# Patient Record
Sex: MALE | Race: BLACK OR AFRICAN AMERICAN | NOT HISPANIC OR LATINO | Employment: UNEMPLOYED | URBAN - METROPOLITAN AREA
[De-identification: names, ages, dates, MRNs, and addresses within clinical notes are randomized per-mention and may not be internally consistent; named-entity substitution may affect disease eponyms.]

---

## 2020-03-24 PROBLEM — C61 PROSTATE CANCER: Status: ACTIVE | Noted: 2020-03-24

## 2020-03-24 PROBLEM — F10.10 ETOH ABUSE: Status: ACTIVE | Noted: 2020-03-24

## 2020-04-02 PROBLEM — K76.89 LIVER NODULE: Status: ACTIVE | Noted: 2020-04-02

## 2020-04-02 PROBLEM — N28.1 SIMPLE RENAL CYST: Status: ACTIVE | Noted: 2020-04-02

## 2020-05-19 ENCOUNTER — NURSE TRIAGE (OUTPATIENT)
Dept: ADMINISTRATIVE | Facility: CLINIC | Age: 60
End: 2020-05-19

## 2020-05-19 NOTE — TELEPHONE ENCOUNTER
Called patient on behalf of [facility]. Pt denied developing any fever, cough or shortness of breath since the procedure.    Reason for Disposition   Follow-up call to recent contact and information only call, no triage required    Protocols used: INFORMATION ONLY CALL - NO TRIAGE-P-[place]

## 2020-07-02 PROBLEM — K76.9: Status: ACTIVE | Noted: 2020-07-02

## 2020-07-02 PROBLEM — Z85.9: Status: ACTIVE | Noted: 2020-07-02

## 2021-02-25 PROBLEM — N52.31 ERECTILE DYSFUNCTION AFTER RADICAL PROSTATECTOMY: Status: ACTIVE | Noted: 2021-02-25

## 2023-08-24 PROBLEM — K14.8 ACQUIRED MACROGLOSSIA: Status: ACTIVE | Noted: 2023-08-24
